# Patient Record
(demographics unavailable — no encounter records)

---

## 2024-12-31 NOTE — REVIEW OF SYSTEMS
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

## 2024-12-31 NOTE — PLAN
[FreeTextEntry1] :  HCM )  follow w/ dental, ophthalmology/optometry as recommended. Diet low fat cholesterol diet Vaccination TDAP 2020  EKG done today at this office for HCM, hyperlipidemia; NSR No acute ST-T changes Lab ( venipuncture ) done today at this office hep B screening  Hyperlipidemia cont' low fat/chol diet, con't statin therapy lipitor 10mg, will adjust med dose as needed after lab review RTC in 6 month for follow up

## 2024-12-31 NOTE — HEALTH RISK ASSESSMENT
[Very Good] : ~his/her~  mood as very good [Yes] : Yes [2 - 3 times a week (3 pts)] : 2 - 3  times a week (3 points) [3 or 4 (1 pt)] : 3 or 4  (1 point) [No] : In the past 12 months have you used drugs other than those required for medical reasons? No [No falls in past year] : Patient reported no falls in the past year [0] : 2) Feeling down, depressed, or hopeless: Not at all (0) [PHQ-2 Negative - No further assessment needed] : PHQ-2 Negative - No further assessment needed [Former] : Former [5-9] : 5-9 [HIV test declined] : HIV test declined [None] : None [With Significant Other] : lives with significant other [Employed] : employed [Single] : single [Sexually Active] : sexually active [Feels Safe at Home] : Feels safe at home [Fully functional (bathing, dressing, toileting, transferring, walking, feeding)] : Fully functional (bathing, dressing, toileting, transferring, walking, feeding) [Fully functional (using the telephone, shopping, preparing meals, housekeeping, doing laundry, using] : Fully functional and needs no help or supervision to perform IADLs (using the telephone, shopping, preparing meals, housekeeping, doing laundry, using transportation, managing medications and managing finances) [Reports normal functional visual acuity (ie: able to read med bottle)] : Reports normal functional visual acuity [Seat Belt] :  uses seat belt [< 15 Years] : < 15 Years [de-identified] : GYM exercise daily [de-identified] : low fat [[id number]] : 0 [de-identified] : quit in 2020 [Hepatitis C test declined] : Hepatitis C test declined [Change in mental status noted] : No change in mental status noted [Language] : denies difficulty with language [Behavior] : denies difficulty with behavior [High Risk Behavior] : no high risk behavior [Reports changes in hearing] : Reports no changes in hearing [Reports changes in vision] : Reports no changes in vision [Reports changes in dental health] : Reports no changes in dental health [HIVDate] : neg 2/2024 [HepatitisCDate] : neg 2/2024

## 2024-12-31 NOTE — HISTORY OF PRESENT ILLNESS
[FreeTextEntry1] : CPE as new pt  [de-identified] : PMH HLD on lipitor 10mg daily for 2-3 years PSH none  Med lipitor 10mg daily

## 2025-07-17 NOTE — PLAN
[FreeTextEntry1] :  HLD off statin at this time, repeat lipid panel today and will decide the need for pharmacological therapy Overweight low calorie diet, increase regular exercise monitor Hb A1c RTC in 6-7 month MO fasting Lab ( venipuncture ) done today at this office

## 2025-07-17 NOTE — HISTORY OF PRESENT ILLNESS
[FreeTextEntry1] : FELECIA [de-identified] : pt has been doing well without any acute illness or injury, trying to be on low fat cholesterol diet.